# Patient Record
Sex: MALE | Race: WHITE
[De-identification: names, ages, dates, MRNs, and addresses within clinical notes are randomized per-mention and may not be internally consistent; named-entity substitution may affect disease eponyms.]

---

## 2020-03-09 ENCOUNTER — HOSPITAL ENCOUNTER (EMERGENCY)
Dept: HOSPITAL 8 - ED | Age: 50
Discharge: HOME | End: 2020-03-09
Payer: MEDICAID

## 2020-03-09 VITALS — HEIGHT: 73 IN | BODY MASS INDEX: 19.4 KG/M2 | WEIGHT: 146.39 LBS

## 2020-03-09 VITALS — SYSTOLIC BLOOD PRESSURE: 143 MMHG | DIASTOLIC BLOOD PRESSURE: 83 MMHG

## 2020-03-09 DIAGNOSIS — L03.114: Primary | ICD-10-CM

## 2020-03-09 DIAGNOSIS — Z85.828: ICD-10-CM

## 2020-03-09 PROCEDURE — 73130 X-RAY EXAM OF HAND: CPT

## 2020-03-09 PROCEDURE — 96372 THER/PROPH/DIAG INJ SC/IM: CPT

## 2020-03-09 PROCEDURE — 99283 EMERGENCY DEPT VISIT LOW MDM: CPT

## 2020-03-09 NOTE — NUR
PT HERE FOR SMALL LEFT WOUND TO LEFT POINTER FINGER THAT HAS NOW ERYTHEM 
WRAPPING AROUND AND SPREADING TOWARDS DISTALLY TO THE WRIST.

## 2020-03-09 NOTE — NUR
REPORT RECEIVED FROM JENNIFER MAYORGA. IMAGING RESULTS REVIEWED BY RUBEN HOLDEN 
ELECTED NOT TO I&D WOUND. CELLULITIS DEMARKATED WITH SKIN MARKER BY RUBEN SOLORZANO. 
PT GIVEN DC RX AND EDCUATED REGARDING DC MEDS. PT INSTRUCTED TO RETURN TO ED IN 
24 HRS FOR RECHECK. PT AMBULATORY WITH STEADY GAIT. ALL QUESTIONS ANSWERED. 

-------------------------------------------------------------------------------

Addendum: 03/09/20 at 1327 by JUANCHO

-------------------------------------------------------------------------------

REPORT RECEIVED FROM JENNIFER MAYORGA. IMAGING RESULTS REVIEWED BY RUBEN HOLDEN 
ELECTED NOT TO I&D WOUND. CELLULITIS DEMARKATED WITH SKIN MARKER BY RUBEN SOLORZANO. 
RN GIVEN DC INSTRUCTIONS. PT GIVEN DC RX AND EDCUATED REGARDING DC MEDS. PT 
INSTRUCTED TO RETURN TO ED IN 24 HRS FOR RECHECK. PT AMBULATORY TO DC WITH 
STEADY GAIT. ALL QUESTIONS ANSWERED.